# Patient Record
Sex: MALE | Race: WHITE | Employment: FULL TIME | URBAN - METROPOLITAN AREA
[De-identification: names, ages, dates, MRNs, and addresses within clinical notes are randomized per-mention and may not be internally consistent; named-entity substitution may affect disease eponyms.]

---

## 2019-09-08 ENCOUNTER — HOSPITAL ENCOUNTER (EMERGENCY)
Age: 44
Discharge: HOME | End: 2019-09-09
Attending: EMERGENCY MEDICINE
Payer: OTHER GOVERNMENT

## 2019-09-08 ENCOUNTER — APPOINTMENT (OUTPATIENT)
Dept: ULTRASOUND IMAGING | Age: 44
End: 2019-09-08
Payer: OTHER GOVERNMENT

## 2019-09-08 VITALS
OXYGEN SATURATION: 96 % | RESPIRATION RATE: 16 BRPM | BODY MASS INDEX: 38.57 KG/M2 | HEIGHT: 66 IN | WEIGHT: 240 LBS | DIASTOLIC BLOOD PRESSURE: 71 MMHG | SYSTOLIC BLOOD PRESSURE: 156 MMHG | HEART RATE: 74 BPM | TEMPERATURE: 98.7 F

## 2019-09-08 DIAGNOSIS — N43.3 HYDROCELE IN ADULT: ICD-10-CM

## 2019-09-08 DIAGNOSIS — N50.819 TESTICULAR PAIN, UNSPECIFIED: ICD-10-CM

## 2019-09-08 DIAGNOSIS — N44.2 TESTICULAR CYST: ICD-10-CM

## 2019-09-08 DIAGNOSIS — L73.9 FOLLICULITIS: ICD-10-CM

## 2019-09-08 DIAGNOSIS — N50.9 SCROTAL SKIN LESION: Primary | ICD-10-CM

## 2019-09-08 DIAGNOSIS — N50.819 TESTICULAR PAIN: ICD-10-CM

## 2019-09-08 PROCEDURE — 99284 EMERGENCY DEPT VISIT MOD MDM: CPT

## 2019-09-08 PROCEDURE — 76870 US EXAM SCROTUM: CPT

## 2019-09-08 PROCEDURE — 93975 VASCULAR STUDY: CPT

## 2019-09-08 PROCEDURE — 6370000000 HC RX 637 (ALT 250 FOR IP): Performed by: EMERGENCY MEDICINE

## 2019-09-08 PROCEDURE — 81001 URINALYSIS AUTO W/SCOPE: CPT

## 2019-09-08 RX ORDER — IBUPROFEN 800 MG/1
800 TABLET ORAL ONCE
Status: COMPLETED | OUTPATIENT
Start: 2019-09-08 | End: 2019-09-08

## 2019-09-08 RX ORDER — SULFAMETHOXAZOLE AND TRIMETHOPRIM 800; 160 MG/1; MG/1
2 TABLET ORAL 2 TIMES DAILY
Qty: 40 TABLET | Refills: 0 | Status: SHIPPED | OUTPATIENT
Start: 2019-09-08 | End: 2019-09-18

## 2019-09-08 RX ADMIN — IBUPROFEN 800 MG: 800 TABLET, FILM COATED ORAL at 23:50

## 2019-09-08 ASSESSMENT — PAIN DESCRIPTION - ORIENTATION: ORIENTATION: RIGHT

## 2019-09-08 ASSESSMENT — PAIN DESCRIPTION - DESCRIPTORS: DESCRIPTORS: PATIENT UNABLE TO DESCRIBE

## 2019-09-08 ASSESSMENT — PAIN DESCRIPTION - PAIN TYPE: TYPE: ACUTE PAIN

## 2019-09-08 ASSESSMENT — PAIN SCALES - GENERAL
PAINLEVEL_OUTOF10: 3
PAINLEVEL_OUTOF10: 4

## 2019-09-08 ASSESSMENT — PAIN DESCRIPTION - FREQUENCY: FREQUENCY: CONTINUOUS

## 2019-09-08 ASSESSMENT — PAIN DESCRIPTION - LOCATION: LOCATION: SCROTUM

## 2019-09-09 LAB
BACTERIA: ABNORMAL /HPF
BILIRUBIN URINE: NEGATIVE
BLOOD, URINE: NEGATIVE
CLARITY: CLEAR
COLOR: YELLOW
GLUCOSE URINE: >=1000 MG/DL
KETONES, URINE: NEGATIVE MG/DL
LEUKOCYTE ESTERASE, URINE: NEGATIVE
NITRITE, URINE: NEGATIVE
PH UA: 6 (ref 5–9)
PROTEIN UA: NEGATIVE MG/DL
RBC UA: ABNORMAL /HPF (ref 0–2)
SPECIFIC GRAVITY UA: 1.02 (ref 1–1.03)
UROBILINOGEN, URINE: 0.2 E.U./DL
WBC UA: ABNORMAL /HPF (ref 0–5)

## 2019-09-09 ASSESSMENT — PAIN DESCRIPTION - PROGRESSION: CLINICAL_PROGRESSION: GRADUALLY IMPROVING

## 2019-09-09 ASSESSMENT — PAIN SCALES - GENERAL: PAINLEVEL_OUTOF10: 2

## 2019-09-09 NOTE — ED PROVIDER NOTES
HPI:  9/8/19, Time: 10:06 PM         Ginger Acosta is a 38 y.o. male presenting to the ED for R testicle pain, beginning 45 minutes ago. The complaint has been persistent, moderate in severity, and worsened by palpation. Patient mentions 45 minutes ago he went to \"adjust himself \"and noticed a lump on his right testicle that was very tender to palpation. Has not had any burning with urination. No fevers or chills. No abdominal pain. No abnormal discharge from the penis. Morning. Patient has been eating and drinking well at home. No other recent illness. No Recent travel. Review of Systems:   Pertinent positives and negatives are stated within HPI, all other systems reviewed and are negative.          --------------------------------------------- PAST HISTORY ---------------------------------------------  Past Medical History:  has no past medical history on file. Past Surgical History:  has no past surgical history on file. Social History:  reports that he has been smoking cigarettes. He has been smoking about 2.00 packs per day. He does not have any smokeless tobacco history on file. He reports that he does not drink alcohol or use drugs. Family History: family history is not on file. The patients home medications have been reviewed. Allergies: Penicillins    -------------------------------------------------- RESULTS -------------------------------------------------  All laboratory and radiology results have been personally reviewed by myself   LABS:  No results found for this visit on 09/08/19. RADIOLOGY:  Interpreted by Radiologist.  [address] DUP ABD PEL RETRO SCROT COMPLETE   Final Result         Solid hypoechoic mass adjacent to or within the right testicle   measures 2.8 x 1.7 x 1.3 cm. This could be infectious, posttraumatic   or malignant. No evidence for torsion. Small left hydrocele.                US SCROTUM AND TESTICLES   Final Result         Solid hypoechoic